# Patient Record
Sex: FEMALE | Race: AMERICAN INDIAN OR ALASKA NATIVE
[De-identification: names, ages, dates, MRNs, and addresses within clinical notes are randomized per-mention and may not be internally consistent; named-entity substitution may affect disease eponyms.]

---

## 2017-11-30 ENCOUNTER — HOSPITAL ENCOUNTER (OUTPATIENT)
Dept: HOSPITAL 5 - MAMMO | Age: 77
Discharge: HOME | End: 2017-11-30
Attending: INTERNAL MEDICINE
Payer: MEDICARE

## 2017-11-30 DIAGNOSIS — Z12.31: Primary | ICD-10-CM

## 2017-11-30 DIAGNOSIS — M17.12: ICD-10-CM

## 2017-11-30 PROCEDURE — 77067 SCR MAMMO BI INCL CAD: CPT

## 2017-11-30 PROCEDURE — 73562 X-RAY EXAM OF KNEE 3: CPT

## 2017-11-30 PROCEDURE — G0202 SCR MAMMO BI INCL CAD: HCPCS

## 2017-11-30 NOTE — XRAY REPORT
LEFT KNEE, 3 views:



History: Left knee effusion, pain.



A large joint effusion is suspected on the lateral view which extends 

to the suprapatellar bursa. There are mild osteoarthritic changes in 

the left knee which is most pronounced in the medial compartment. No 

evidence for fracture or bone lesion.



IMPRESSION:

Left knee effusion. Mild osteoarthritis.

## 2017-12-01 NOTE — MAMMOGRAPHY REPORT
BILATERAL DIGITAL SCREENING MAMMOGRAM with CAD: 11/30/17 13:17:00



CLINICAL: Routine screening.



COMPARISON:09/07/16



FINDINGS: The breasts are mostly fatty with bilateral residual 

heterogeneously dense retroareolar fibroglandular densities. No mass, 

architectural distortion or suspicious calcifications.



IMPRESSION: No mammographic evidence of malignancy.



BI-RADS CATEGORY: 1 - - Negative



RECOMMENDATION: Routine mammographic screening in one year.





COMMENT:

Patient follow-up letters are generated by our [employer] application.

## 2019-03-06 ENCOUNTER — HOSPITAL ENCOUNTER (OUTPATIENT)
Dept: HOSPITAL 5 - MAMMO | Age: 79
Discharge: HOME | End: 2019-03-06
Attending: INTERNAL MEDICINE
Payer: MEDICARE

## 2019-03-06 DIAGNOSIS — Z12.31: Primary | ICD-10-CM

## 2019-03-06 PROCEDURE — 77067 SCR MAMMO BI INCL CAD: CPT

## 2019-03-06 NOTE — MAMMOGRAPHY REPORT
BILATERAL DIGITAL SCREENING MAMMOGRAM with CAD: 03/06/19 09:50:00



CLINICAL: Routine screening.



COMPARISON: 11/30/17



FINDINGS: There are bilateral scattered areas of fibroglandular 

density.A few bilateral scattered benign calcifications.No mass, 

architectural distortion or suspicious calcifications.



IMPRESSION: No mammographic evidence of malignancy.



BI-RADS CATEGORY: 2 - - Benign



RECOMMENDATION: Routine mammographic screening in one year.



COMMENT:

Patient follow-up letters are generated by our [employer] application.